# Patient Record
Sex: MALE
[De-identification: names, ages, dates, MRNs, and addresses within clinical notes are randomized per-mention and may not be internally consistent; named-entity substitution may affect disease eponyms.]

---

## 2023-10-05 ENCOUNTER — NURSE TRIAGE (OUTPATIENT)
Dept: OTHER | Facility: CLINIC | Age: 83
End: 2023-10-05

## 2023-10-05 NOTE — TELEPHONE ENCOUNTER
Location of patient: [place]    Received call from Katia Davenport at [facility]; Patient with Red Flag Complaint requesting to establish care with [facility]. Subjective: Caller states \"Swelling on L side of L knee, feels uncomfortable, not painful x 2 days, a little itchy, like something is in there\"     Current Symptoms: L knee swelling and discomfort. Denies pain, wounds or any injury    Onset: 2 days ago; gradual    Associated Symptoms: NA    Pain Severity: 0/10; N/A; none    Temperature: Denies     Recommended disposition: See PCP within 3 Days    Care advice provided, patient verbalizes understanding; denies any other questions or concerns; instructed to call back for any new or worsening symptoms. Patient/Caller agrees with recommended disposition; writer provided warm transfer to Melanie Atkins at [facility] for appointment scheduling    Attention Provider: Thank you for allowing me to participate in the care of your patient. The patient was connected to triage in response to information provided to the E[facility]. Please do not respond through this encounter as the response is not directed to a shared pool.     Reason for Disposition   MILD or MODERATE swelling (e.g., can't move joint normally, can't do usual activities) (Exceptions: Itchy, localized swelling; swelling is chronic.)    Protocols used: Knee Swelling-ADULT-[place]